# Patient Record
Sex: MALE | Race: WHITE | NOT HISPANIC OR LATINO | Employment: FULL TIME | URBAN - METROPOLITAN AREA
[De-identification: names, ages, dates, MRNs, and addresses within clinical notes are randomized per-mention and may not be internally consistent; named-entity substitution may affect disease eponyms.]

---

## 2017-10-29 ENCOUNTER — OFFICE VISIT (OUTPATIENT)
Dept: URGENT CARE | Facility: CLINIC | Age: 62
End: 2017-10-29
Payer: COMMERCIAL

## 2017-10-29 PROCEDURE — G0382 LEV 3 HOSP TYPE B ED VISIT: HCPCS

## 2018-01-18 NOTE — PROGRESS NOTES
Assessment    1  Left lower quadrant pain (789 04) (R10 32)   2  Viral illness (079 99) (B34 9)    Discussion/Summary  Discussion Summary:   Patient to proceed to [facility] or [address] ED for further evaluation of abdominal pain  Chief Complaint    1  Sore Throat    History of Present Illness  HPI: 63yo M p/w LLQ abdominal pain, nasal congestion, sore throat, and dry cough x 1 day  Pt denies hx of abdominal pain and denies diverticulosis  Pt states abdominal pain is sharp and 9/10 in intensity  Pain is made worse with movement  Pt denies fever, chills, weakness, n/v/d  Review of Systems  Focused-Male:   Constitutional: no fever or chills, feels well, no tiredness, no recent weight loss or weight gain  ENT: sore throat and nasal discharge, but no earache, no nosebleeds, no hearing loss and no hoarseness  Cardiovascular: no complaints of slow or fast heart rate, no chest pain, no palpitations, no leg claudication or lower extremity edema  Respiratory: cough, but no shortness of breath, no orthopnea, no wheezing, no shortness of breath during exertion and no PND  Gastrointestinal: abdominal pain, but no nausea, no vomiting, no constipation, no diarrhea and no blood in stools  Genitourinary: no complaints of dysuria or incontinence, no hesitancy, no nocturia, no genital lesion, no inadequacy of penile erection  Musculoskeletal: no complaints of arthralgia, no myalgia, no joint swelling or stiffness, no limb pain or swelling  Integumentary: no complaints of skin rash or lesion, no itching or dry skin, no skin wounds  Neurological: no complaints of headache, no confusion, no numbness or tingling, no dizziness or fainting  ROS Reviewed:   ROS reviewed  Past Medical History    1  History of gastroesophageal reflux (GERD) (V12 79) (Z87 19)   2  History of hypertension (V12 59) (Z86 79)  Active Problems And Past Medical History Reviewed:    The active problems and past medical history were reviewed and updated today  Family History  Family History Reviewed: The family history was reviewed and updated today  Social History    · Nonsmoker (V49 89) (Z78 9)   · Social alcohol use (Z78 9)  Social History Reviewed: The social history was reviewed and is unchanged  Surgical History  Surgical History Reviewed: The surgical history was reviewed and updated today  Current Meds   1  Bisoprolol-Hydrochlorothiazide 10-6 25 MG Oral Tablet; Therapy: (Recorded:29Oct2017) to Recorded   2  Protonix 40 MG Oral Tablet Delayed Release; Therapy: ([id number]) to Recorded  Medication List Reviewed: The medication list was reviewed and updated today  Allergies    1  No Known Drug Allergies    Vitals  Signs   Recorded: 29Oct2017 10:57AM   Temperature: 99 2 F, Tympanic  Heart Rate: 73  Respiration: 16  Blood Pressure: 151 mm Hg  Blood Pressure: 77 mm Hg  Height: 5 ft 7 in  Weight: 200 lb   BMI Calculated: 31 32 kg/m2  BSA Calculated: 2 02 m2  O2 Saturation: 97  Pain Scale: 5    Physical Exam    Constitutional   General appearance: No acute distress, well appearing and well nourished  Eyes   Conjunctiva and lids: No swelling, erythema, or discharge  Pupils and irises: Equal, round and reactive to light  Ears, Nose, Mouth, and Throat   External inspection of ears and nose: Normal     Otoscopic examination: Tympanic membrance translucent with normal light reflex  Canals patent without erythema  Nasal mucosa, septum, and turbinates: Abnormal   normal nasal septum, no intranasal masses or polyps and normal nasal turbinates  There was clear rhinorrhea from both nares  The bilateral nasal mucosa was edematous  Oropharynx: Abnormal   clear post nasal drip  Pulmonary   Respiratory effort: Abnormal   Respiratory rate: normal  Assessment of respiratory effort revealed normal rhythm and effort  Respiratory Findings: dry cough  Auscultation of lungs: Clear to auscultation    no rales or crackles were heard bilaterally  no rhonchi  no friction rub  no wheezing  no diminished breath sounds  Cardiovascular   Palpation of heart: Normal PMI, no thrills  Auscultation of heart: Normal rate and rhythm, normal S1 and S2, without murmurs  Examination of extremities for edema and/or varicosities: Normal     Abdomen   Abdomen: Abnormal   The abdomen was rounded  Bowel sounds were normal  There was moderate tenderness in the left lower quadrant  no masses palpated  The abdomen was normal to percussion  no CVA tenderness   Liver and spleen: No hepatomegaly or splenomegaly  Lymphatic   Palpation of lymph nodes in neck: Abnormal   bilateral anterior cervical node enlargement, but no posterior cervical node enlargement  Skin   Skin and subcutaneous tissue: Normal without rashes or lesions      Psychiatric   Orientation to person, place and time: Normal     Mood and affect: Normal        Signatures   Electronically signed by : ANASTASIIA Trujillo; Oct 29 2017 11:09AM EST                       (Author)    Electronically signed by : WAQAS Schroeder ; Oct 30 2017  8:54AM EST                       (Co-author)

## 2018-05-20 ENCOUNTER — OFFICE VISIT (OUTPATIENT)
Dept: URGENT CARE | Facility: CLINIC | Age: 63
End: 2018-05-20
Payer: COMMERCIAL

## 2018-05-20 VITALS
RESPIRATION RATE: 20 BRPM | BODY MASS INDEX: 32.18 KG/M2 | DIASTOLIC BLOOD PRESSURE: 70 MMHG | OXYGEN SATURATION: 100 % | HEIGHT: 67 IN | WEIGHT: 205 LBS | TEMPERATURE: 101 F | SYSTOLIC BLOOD PRESSURE: 140 MMHG

## 2018-05-20 DIAGNOSIS — J06.9 UPPER RESPIRATORY TRACT INFECTION, UNSPECIFIED TYPE: Primary | ICD-10-CM

## 2018-05-20 PROCEDURE — G0382 LEV 3 HOSP TYPE B ED VISIT: HCPCS | Performed by: EMERGENCY MEDICINE

## 2018-05-20 RX ORDER — METHYLPREDNISOLONE 4 MG/1
TABLET ORAL
Qty: 21 TABLET | Refills: 0 | Status: SHIPPED | OUTPATIENT
Start: 2018-05-20

## 2018-05-20 RX ORDER — IBUPROFEN 200 MG
400 TABLET ORAL EVERY 6 HOURS PRN
COMMUNITY

## 2018-05-20 RX ORDER — LISINOPRIL 20 MG/1
20 TABLET ORAL DAILY
COMMUNITY

## 2018-05-20 NOTE — PROGRESS NOTES
[facility]        NAME: Akash Mack  is a 61 y o  male  : 1955    MRN: [id number]  DATE: May 20, 2018  TIME: 3:55 PM    Assessment and Plan   Upper respiratory tract infection, unspecified type [J06 9]  1  Upper respiratory tract infection, unspecified type  Methylprednisolone 4 MG TBPK         Patient Instructions     Patient Instructions   You have been diagnosed with a Viral Upper Respiratory infection and your symptoms should resolve over the next 7 to 10 days with the treatments recommended today  If they do not, it is possible that you have developed a bacterial infection and you should return  Take an expectorant - guaifenesin should be the only ingredient - during the day, and the cough suppressant recommended at night only  Take Zinc 12 5 to 15 mg every 2 - 3 hrs while awake for the next few days  You may take Cold Saúl (13 3 mg of Zinc) or split a 25 mg Zinc tablet or lozenge in two or a 50 mg into four to get the proper dose  The total daily dose of Zinc should exceed 75 mg per day  Hold any NSAIDs like Ibuprofen (Advil), Naprosyn (Aleve), etc while on steroids like Medrol or Prednisone    Upper Respiratory Infection, Ambulatory Care   GENERAL INFORMATION:   An upper respiratory infection  is also called a common cold  It can affect your nose, throat, ears, and sinuses  Common symptoms include the following:   · Runny or stuffy nose    · Sneezing and coughing    · Sore throat or hoarseness    · Red, watery, and sore eyes    · Tiredness or restlessness    · Chills and fever    · Headache, body aches, or sore muscles  Seek immediate care for the following symptoms:   · Headaches or a stiff neck    · Bright lights hurt your eyes    · Chest pain or trouble breathing  Treatment for an upper respiratory infection  may include any of the following:  · Decongestants  help decrease nasal congestion and improve your breathing   Do not use decongestant sprays for more than a few days     · Cough suppressants  help decrease coughing  Ask your healthcare provider which type of cough medicine is best for you  Some cough medicines need a doctor's order  · NSAIDs  help decrease swelling and pain or fever  This medicine is available with or without a doctor's order  NSAIDs can cause stomach bleeding or kidney problems in certain people  If you take blood thinner medicine, always ask your healthcare provider if NSAIDs are safe for you  Always read the medicine label and follow directions  Care for an upper respiratory infection:   · Rest  until your fever is gone or you feel better  · Drink liquids as directed to prevent dehydration  You may need to drink 8 to 10 cups of liquid each day  Good liquids to drink include water, ginger ale, tea, or fruit juices  · Gargle  with warm salt water to help your sore throat feel better  Mix ¼ teaspoon salt with 1 cup warm water  You may also suck on hard candy or throat lozenges  · Saline nasal drops  help loosen your nasal congestion  They can be bought without a doctor's order  · Take a warm bath or shower  to help decrease body aches and help you breathe easier  · Use a cool-mist humidifier  to increase air moisture and make it easier for you to breathe  Prevent the spread of germs:   · Avoid others for the first 2 to 3 days of your cold  Germs are easily spread during this time  · Do not share food, drinks,  towels, or personal items with others  · Wash your hands often  Use soap and water  Wash your hands after you use the bathroom, change a child's diapers, or sneeze  Wash your hands before you prepare or eat food  Cover your mouth and nose with a tissue when you sneeze or cough  Follow up with your healthcare provider as directed:  Write down your questions so you remember to ask them during your visits  CARE AGREEMENT:   You have the right to help plan your care   Learn about your health condition and how it may be treated  Discuss treatment options with your caregivers to decide what care you want to receive  You always have the right to refuse treatment  The above information is an  only  It is not intended as medical advice for individual conditions or treatments  Talk to your doctor, nurse or pharmacist before following any medical regimen to see if it is safe and effective for you  © 2014 [address] is for End User's use only and may not be sold, redistributed or otherwise used for commercial purposes  All illustrations and images included in CareNotes® are the copyrighted property of [employer]  or [employer]  Follow up with PCP in 3-5 days  Proceed to  ER if symptoms worsen  Chief Complaint     Chief Complaint   Patient presents with    Cough     fever post nasal drip started last night         History of Present Illness       He complains of ongestion, cough and fever for past day  Review of Systems   Review of Systems   HENT: Positive for congestion, rhinorrhea and sinus pressure  Negative for ear discharge, ear pain and hearing loss  Respiratory: Positive for cough  Negative for shortness of breath and wheezing  Gastrointestinal: Negative for diarrhea and vomiting  Musculoskeletal: Negative for neck stiffness  Skin: Negative for pallor  Neurological: Negative for headaches           Current Medications       Current Outpatient Prescriptions:     ibuprofen (MOTRIN) 200 mg tablet, Take 400 mg by mouth every 6 (six) hours as needed for mild pain, Disp: , Rfl:     lisinopril (ZESTRIL) 20 mg tablet, Take 20 mg by mouth daily, Disp: , Rfl:     Methylprednisolone 4 MG TBPK, Use as directed on package, Disp: 21 tablet, Rfl: 0    Current Allergies     Allergies as of 05/20/2018 - Reviewed 05/20/2018   Allergen Reaction Noted    Sulfa antibiotics Rash 05/20/2018            The following portions of the patient's history were reviewed and updated as appropriate: allergies, current medications, past family history, past medical history, past social history, past surgical history and problem list      Past Medical History:   Diagnosis Date    Hypertension        History reviewed  No pertinent surgical history  No family history on file  Medications have been verified  Objective   /70 (BP Location: Left arm, Patient Position: Sitting, Cuff Size: Standard)   Temp (!) 101 °F (38 3 °C) (Tympanic)   Resp 20   Ht 5' 7" (1 702 m)   Wt 93 kg (205 lb)   SpO2 100%   BMI 32 11 kg/m²        Physical Exam     Physical Exam   Constitutional: He is oriented to person, place, and time  He appears well-developed and well-nourished  No distress  HENT:   Head: Normocephalic and atraumatic  Right Ear: Tympanic membrane, external ear and ear canal normal    Left Ear: Tympanic membrane, external ear and ear canal normal    Nose: Mucosal edema and rhinorrhea present  Mouth/Throat: Posterior oropharyngeal erythema present  Eyes: Conjunctivae are normal  Pupils are equal, round, and reactive to light  Neck: Neck supple  Cardiovascular: Normal rate, regular rhythm and normal heart sounds  Pulmonary/Chest: Effort normal and breath sounds normal    Abdominal: Soft  Bowel sounds are normal    Musculoskeletal: Normal range of motion  Neurological: He is alert and oriented to person, place, and time  Skin: Skin is warm and dry  He is not diaphoretic  No pallor  Psychiatric: He has a normal mood and affect  Nursing note and vitals reviewed

## 2018-05-20 NOTE — PATIENT INSTRUCTIONS
You have been diagnosed with a Viral Upper Respiratory infection and your symptoms should resolve over the next 7 to 10 days with the treatments recommended today  If they do not, it is possible that you have developed a bacterial infection and you should return  Take an expectorant - guaifenesin should be the only ingredient - during the day, and the cough suppressant recommended at night only  Take Zinc 12 5 to 15 mg every 2 - 3 hrs while awake for the next few days  You may take Cold Saúl (13 3 mg of Zinc) or split a 25 mg Zinc tablet or lozenge in two or a 50 mg into four to get the proper dose  The total daily dose of Zinc should exceed 75 mg per day  Hold any NSAIDs like Ibuprofen (Advil), Naprosyn (Aleve), etc while on steroids like Medrol or Prednisone    Upper Respiratory Infection, Ambulatory Care   GENERAL INFORMATION:   An upper respiratory infection  is also called a common cold  It can affect your nose, throat, ears, and sinuses  Common symptoms include the following:   · Runny or stuffy nose    · Sneezing and coughing    · Sore throat or hoarseness    · Red, watery, and sore eyes    · Tiredness or restlessness    · Chills and fever    · Headache, body aches, or sore muscles  Seek immediate care for the following symptoms:   · Headaches or a stiff neck    · Bright lights hurt your eyes    · Chest pain or trouble breathing  Treatment for an upper respiratory infection  may include any of the following:  · Decongestants  help decrease nasal congestion and improve your breathing  Do not use decongestant sprays for more than a few days  · Cough suppressants  help decrease coughing  Ask your healthcare provider which type of cough medicine is best for you  Some cough medicines need a doctor's order  · NSAIDs  help decrease swelling and pain or fever  This medicine is available with or without a doctor's order  NSAIDs can cause stomach bleeding or kidney problems in certain people   If you take blood thinner medicine, always ask your healthcare provider if NSAIDs are safe for you  Always read the medicine label and follow directions  Care for an upper respiratory infection:   · Rest  until your fever is gone or you feel better  · Drink liquids as directed to prevent dehydration  You may need to drink 8 to 10 cups of liquid each day  Good liquids to drink include water, ginger ale, tea, or fruit juices  · Gargle  with warm salt water to help your sore throat feel better  Mix ¼ teaspoon salt with 1 cup warm water  You may also suck on hard candy or throat lozenges  · Saline nasal drops  help loosen your nasal congestion  They can be bought without a doctor's order  · Take a warm bath or shower  to help decrease body aches and help you breathe easier  · Use a cool-mist humidifier  to increase air moisture and make it easier for you to breathe  Prevent the spread of germs:   · Avoid others for the first 2 to 3 days of your cold  Germs are easily spread during this time  · Do not share food, drinks,  towels, or personal items with others  · Wash your hands often  Use soap and water  Wash your hands after you use the bathroom, change a child's diapers, or sneeze  Wash your hands before you prepare or eat food  Cover your mouth and nose with a tissue when you sneeze or cough  Follow up with your healthcare provider as directed:  Write down your questions so you remember to ask them during your visits  CARE AGREEMENT:   You have the right to help plan your care  Learn about your health condition and how it may be treated  Discuss treatment options with your caregivers to decide what care you want to receive  You always have the right to refuse treatment  The above information is an  only  It is not intended as medical advice for individual conditions or treatments   Talk to your doctor, nurse or pharmacist before following any medical regimen to see if it is safe and effective for you  © 2014 [address] is for End User's use only and may not be sold, redistributed or otherwise used for commercial purposes  All illustrations and images included in CareNotes® are the copyrighted property of A D [employer] M [employer]  or [employer]era Brasher

## 2019-03-29 ENCOUNTER — TRANSCRIBE ORDERS (OUTPATIENT)
Dept: URGENT CARE | Facility: CLINIC | Age: 64
End: 2019-03-29

## 2019-03-29 ENCOUNTER — APPOINTMENT (OUTPATIENT)
Dept: RADIOLOGY | Facility: CLINIC | Age: 64
End: 2019-03-29
Payer: COMMERCIAL

## 2019-03-29 DIAGNOSIS — M47.817 SPONDYLOSIS WITHOUT MYELOPATHY OR RADICULOPATHY, LUMBOSACRAL REGION: Primary | ICD-10-CM

## 2019-03-29 DIAGNOSIS — M47.817 SPONDYLOSIS WITHOUT MYELOPATHY OR RADICULOPATHY, LUMBOSACRAL REGION: ICD-10-CM

## 2019-03-29 DIAGNOSIS — M46.1 SACROILIITIS, NOT ELSEWHERE CLASSIFIED (HCC): ICD-10-CM

## 2019-03-29 PROCEDURE — 73522 X-RAY EXAM HIPS BI 3-4 VIEWS: CPT

## 2019-03-29 PROCEDURE — 72110 X-RAY EXAM L-2 SPINE 4/>VWS: CPT

## 2019-11-02 ENCOUNTER — LAB REQUISITION (OUTPATIENT)
Dept: LAB | Facility: HOSPITAL | Age: 64
End: 2019-11-02
Payer: COMMERCIAL

## 2019-11-02 DIAGNOSIS — Z13.220 ENCOUNTER FOR SCREENING FOR LIPOID DISORDERS: ICD-10-CM

## 2019-11-02 LAB — CHOLEST SERPL-MCNC: 169 MG/DL (ref 50–200)

## 2020-06-16 ENCOUNTER — DOCTOR'S OFFICE (OUTPATIENT)
Dept: URBAN - NONMETROPOLITAN AREA CLINIC 1 | Facility: CLINIC | Age: 65
Setting detail: OPHTHALMOLOGY
End: 2020-06-16
Payer: COMMERCIAL

## 2020-06-16 DIAGNOSIS — H52.4: ICD-10-CM

## 2020-06-16 PROCEDURE — 92014 COMPRE OPH EXAM EST PT 1/>: CPT | Performed by: OPTOMETRIST

## 2020-06-16 ASSESSMENT — REFRACTION_CURRENTRX
OS_SPHERE: +0.75
OS_VPRISM_DIRECTION: BF
OS_ADD: +2.25
OD_AXIS: 106
OD_SPHERE: +1.00
OD_ADD: +2.25
OD_OVR_VA: 20/
OS_OVR_VA: 20/
OS_AXIS: 76
OD_CYLINDER: -0.50
OS_CYLINDER: -0.50
OD_VPRISM_DIRECTION: BF

## 2020-06-16 ASSESSMENT — SPHEQUIV_DERIVED
OS_SPHEQUIV: 0.5
OD_SPHEQUIV: 0.75
OD_SPHEQUIV: 0.5
OS_SPHEQUIV: 0.25

## 2020-06-16 ASSESSMENT — REFRACTION_MANIFEST
OS_ADD: +2.50
OD_AXIS: 105
OS_VA1: 20/25-2
OD_VA2: 20/25-2
OS_AXIS: 070
OS_CYLINDER: -0.50
OS_SPHERE: +0.75
OD_ADD: +2.50
OS_VA2: 20/25-2
OD_SPHERE: +0.75
OD_VA1: 20/25-2
OD_CYLINDER: -0.50

## 2020-06-16 ASSESSMENT — REFRACTION_AUTOREFRACTION
OD_CYLINDER: -1.00
OS_SPHERE: +0.75
OD_SPHERE: +1.25
OS_CYLINDER: -1.00
OS_AXIS: 060
OD_AXIS: 088

## 2020-06-16 ASSESSMENT — LID POSITION - PTOSIS
OD_PTOSIS: RUL +2
OS_PTOSIS: LUL +1

## 2020-06-16 ASSESSMENT — VISUAL ACUITY
OD_BCVA: 20/30+2
OS_BCVA: 20/25-2

## 2020-06-16 ASSESSMENT — CONFRONTATIONAL VISUAL FIELD TEST (CVF)
OS_FINDINGS: FULL
OD_FINDINGS: FULL

## 2021-03-10 ENCOUNTER — DOCTOR'S OFFICE (OUTPATIENT)
Dept: URBAN - NONMETROPOLITAN AREA CLINIC 1 | Facility: CLINIC | Age: 66
Setting detail: OPHTHALMOLOGY
End: 2021-03-10
Payer: COMMERCIAL

## 2021-03-10 ENCOUNTER — OPTICAL OFFICE (OUTPATIENT)
Dept: URBAN - NONMETROPOLITAN AREA CLINIC 4 | Facility: CLINIC | Age: 66
Setting detail: OPHTHALMOLOGY
End: 2021-03-10
Payer: COMMERCIAL

## 2021-03-10 DIAGNOSIS — H52.223: ICD-10-CM

## 2021-03-10 DIAGNOSIS — H52.4: ICD-10-CM

## 2021-03-10 DIAGNOSIS — H35.3131: ICD-10-CM

## 2021-03-10 PROBLEM — H02.403: Status: ACTIVE | Noted: 2020-06-16

## 2021-03-10 PROCEDURE — 92015 DETERMINE REFRACTIVE STATE: CPT | Performed by: OPTOMETRIST

## 2021-03-10 PROCEDURE — 92134 CPTRZ OPH DX IMG PST SGM RTA: CPT | Performed by: OPTOMETRIST

## 2021-03-10 PROCEDURE — V9999 DISPENSING FEE: HCPCS | Performed by: OPTOMETRIST

## 2021-03-10 PROCEDURE — 92014 COMPRE OPH EXAM EST PT 1/>: CPT | Performed by: OPTOMETRIST

## 2021-03-10 PROCEDURE — V2799 MISC VISION ITEM OR SERVICE: HCPCS | Performed by: OPTOMETRIST

## 2021-03-10 PROCEDURE — V2020 VISION SVCS FRAMES PURCHASES: HCPCS | Performed by: OPTOMETRIST

## 2021-03-10 PROCEDURE — V2025 EYEGLASSES DELUX FRAMES: HCPCS | Performed by: OPTOMETRIST

## 2021-03-10 ASSESSMENT — REFRACTION_CURRENTRX
OS_CYLINDER: -0.50
OS_AXIS: 76
OD_AXIS: 106
OD_SPHERE: +1.00
OS_OVR_VA: 20/
OD_ADD: +2.25
OD_OVR_VA: 20/
OD_CYLINDER: -0.50
OS_SPHERE: +0.75
OS_VPRISM_DIRECTION: BF
OD_VPRISM_DIRECTION: BF
OS_ADD: +2.25

## 2021-03-10 ASSESSMENT — REFRACTION_MANIFEST
OD_VA1: 20/25-2
OS_ADD: +2.50
OD_VA2: 20/25-2
OS_VA2: 20/25-2
OD_AXIS: 105
OS_SPHERE: +0.75
OD_ADD: +2.50
OS_CYLINDER: -0.50
OS_AXIS: 070
OS_VA1: 20/30-2
OD_CYLINDER: -0.50
OD_SPHERE: +1.00

## 2021-03-10 ASSESSMENT — REFRACTION_AUTOREFRACTION
OD_CYLINDER: -0.75
OS_AXIS: 072
OD_AXIS: 160
OS_SPHERE: +2.75
OD_SPHERE: +1.00
OS_CYLINDER: -2.50

## 2021-03-10 ASSESSMENT — TONOMETRY
OS_IOP_MMHG: 15
OD_IOP_MMHG: 15

## 2021-03-10 ASSESSMENT — SPHEQUIV_DERIVED
OD_SPHEQUIV: 0.625
OD_SPHEQUIV: 0.75
OS_SPHEQUIV: 1.5
OS_SPHEQUIV: 0.5

## 2021-03-10 ASSESSMENT — LID POSITION - PTOSIS
OD_PTOSIS: RUL +2
OS_PTOSIS: LUL +1

## 2021-03-10 ASSESSMENT — CONFRONTATIONAL VISUAL FIELD TEST (CVF)
OS_FINDINGS: FULL
OD_FINDINGS: FULL

## 2021-03-10 ASSESSMENT — VISUAL ACUITY
OS_BCVA: 20/30-1
OD_BCVA: 20/30-2

## 2024-05-11 ENCOUNTER — HOSPITAL ENCOUNTER (EMERGENCY)
Facility: HOSPITAL | Age: 69
Discharge: HOME | End: 2024-05-11
Attending: EMERGENCY MEDICINE
Payer: COMMERCIAL

## 2024-05-11 ENCOUNTER — APPOINTMENT (EMERGENCY)
Dept: RADIOLOGY | Facility: HOSPITAL | Age: 69
End: 2024-05-11
Payer: COMMERCIAL

## 2024-05-11 VITALS
TEMPERATURE: 97.3 F | HEIGHT: 67 IN | HEART RATE: 70 BPM | BODY MASS INDEX: 29.58 KG/M2 | OXYGEN SATURATION: 97 % | SYSTOLIC BLOOD PRESSURE: 150 MMHG | DIASTOLIC BLOOD PRESSURE: 82 MMHG | WEIGHT: 188.49 LBS | RESPIRATION RATE: 16 BRPM

## 2024-05-11 DIAGNOSIS — S61.220A LACERATION OF RIGHT INDEX FINGER WITH FOREIGN BODY WITHOUT DAMAGE TO NAIL, INITIAL ENCOUNTER: Primary | ICD-10-CM

## 2024-05-11 PROCEDURE — 99284 EMERGENCY DEPT VISIT MOD MDM: CPT | Performed by: PHYSICIAN ASSISTANT

## 2024-05-11 PROCEDURE — 90471 IMMUNIZATION ADMIN: CPT

## 2024-05-11 PROCEDURE — 12002 RPR S/N/AX/GEN/TRNK2.6-7.5CM: CPT | Performed by: PHYSICIAN ASSISTANT

## 2024-05-11 PROCEDURE — 90715 TDAP VACCINE 7 YRS/> IM: CPT | Performed by: PHYSICIAN ASSISTANT

## 2024-05-11 PROCEDURE — 73140 X-RAY EXAM OF FINGER(S): CPT

## 2024-05-11 PROCEDURE — 99283 EMERGENCY DEPT VISIT LOW MDM: CPT

## 2024-05-11 RX ORDER — ASPIRIN 81 MG/1
81 TABLET, CHEWABLE ORAL DAILY
COMMUNITY

## 2024-05-11 RX ORDER — CEPHALEXIN 500 MG/1
500 CAPSULE ORAL EVERY 6 HOURS SCHEDULED
Qty: 28 CAPSULE | Refills: 0 | Status: SHIPPED | OUTPATIENT
Start: 2024-05-11 | End: 2024-05-18

## 2024-05-11 RX ORDER — CEPHALEXIN 250 MG/1
500 CAPSULE ORAL ONCE
Status: COMPLETED | OUTPATIENT
Start: 2024-05-11 | End: 2024-05-11

## 2024-05-11 RX ORDER — ROSUVASTATIN CALCIUM 20 MG/1
20 TABLET, COATED ORAL DAILY
COMMUNITY
Start: 2024-01-16

## 2024-05-11 RX ORDER — GINSENG 100 MG
1 CAPSULE ORAL ONCE
Status: COMPLETED | OUTPATIENT
Start: 2024-05-11 | End: 2024-05-11

## 2024-05-11 RX ORDER — BISOPROLOL FUMARATE 5 MG/1
5 TABLET, FILM COATED ORAL DAILY
COMMUNITY
Start: 2024-01-16

## 2024-05-11 RX ORDER — LIDOCAINE HYDROCHLORIDE 10 MG/ML
10 INJECTION, SOLUTION EPIDURAL; INFILTRATION; INTRACAUDAL; PERINEURAL ONCE
Status: COMPLETED | OUTPATIENT
Start: 2024-05-11 | End: 2024-05-11

## 2024-05-11 RX ORDER — ONDANSETRON 4 MG/1
4 TABLET, ORALLY DISINTEGRATING ORAL ONCE
Status: COMPLETED | OUTPATIENT
Start: 2024-05-11 | End: 2024-05-11

## 2024-05-11 RX ORDER — PANTOPRAZOLE SODIUM 40 MG
40 TABLET, DELAYED RELEASE (ENTERIC COATED) ORAL DAILY
COMMUNITY

## 2024-05-11 RX ADMIN — TETANUS TOXOID, REDUCED DIPHTHERIA TOXOID AND ACELLULAR PERTUSSIS VACCINE, ADSORBED 0.5 ML: 5; 2.5; 8; 8; 2.5 SUSPENSION INTRAMUSCULAR at 11:29

## 2024-05-11 RX ADMIN — LIDOCAINE HYDROCHLORIDE 10 ML: 10 INJECTION, SOLUTION EPIDURAL; INFILTRATION; INTRACAUDAL; PERINEURAL at 11:29

## 2024-05-11 RX ADMIN — BACITRACIN 1 LARGE APPLICATION: 500 OINTMENT TOPICAL at 12:32

## 2024-05-11 RX ADMIN — CEPHALEXIN 500 MG: 250 CAPSULE ORAL at 12:32

## 2024-05-11 RX ADMIN — ONDANSETRON 4 MG: 4 TABLET, ORALLY DISINTEGRATING ORAL at 11:39

## 2024-05-11 NOTE — DISCHARGE INSTRUCTIONS
Clean and dry for the first 48 hours, then can change dressing.  Please have the sutures removed in 12 days.  This was a complex laceration repair.  Please wear splint to keep the sutures intact and safe.  Due to the active grinding the metal most of the fragments were removed but there are probably some small metal fragments intact.  Please continue antibiotics.

## 2024-05-11 NOTE — ED PROVIDER NOTES
History  Chief Complaint   Patient presents with    Finger Laceration     Right pointer finger laceration while grinding. States tetanus is not UTD.      Patient is a 69-year-old male presents emerged from today with a chief complaint of a finger laceration.  Patient states that he was grinding prior to arrival and accidentally caused a laceration to his right index finger.  Last tetanus was in 2012.  Patient is right-handed.  Patient is on 81 mg aspirin daily.  Came directly to the ER.      Finger Laceration  Location:  Finger  Length:  4 cm  Quality: jagged    Bleeding: controlled with pressure    Laceration mechanism:  Metal edge  Pain details:     Severity:  Moderate  Foreign body present:  Metal  Tetanus status:  Unknown  Associated symptoms: no fever and no numbness        Prior to Admission Medications   Prescriptions Last Dose Informant Patient Reported? Taking?   Methylprednisolone 4 MG TBPK Not Taking  No No   Sig: Use as directed on package   Patient not taking: Reported on 5/11/2024   Protonix 40 MG tablet   Yes Yes   Sig: Take 40 mg by mouth daily   aspirin 81 mg chewable tablet   Yes Yes   Sig: Chew 81 mg daily   bisoprolol (ZEBETA) 5 mg tablet   Yes Yes   Sig: Take 5 mg by mouth daily   ibuprofen (MOTRIN) 200 mg tablet  Self Yes No   Sig: Take 400 mg by mouth every 6 (six) hours as needed for mild pain   lisinopril (ZESTRIL) 20 mg tablet Not Taking Self Yes No   Sig: Take 20 mg by mouth daily   Patient not taking: Reported on 5/11/2024   rosuvastatin (CRESTOR) 20 MG tablet   Yes Yes   Sig: Take 20 mg by mouth daily      Facility-Administered Medications: None       Past Medical History:   Diagnosis Date    Atherosclerosis of coronary artery     GERD (gastroesophageal reflux disease)     Hypertension     IBS (irritable bowel syndrome)        No past surgical history on file.    No family history on file.  I have reviewed and agree with the history as documented.    E-Cigarette/Vaping      E-Cigarette/Vaping Substances     Social History     Tobacco Use    Smoking status: Never    Smokeless tobacco: Never   Substance Use Topics    Alcohol use: No    Drug use: No       Review of Systems   Constitutional:  Negative for fever.       Physical Exam  Physical Exam  Vitals and nursing note reviewed.   Constitutional:       General: He is in acute distress.      Appearance: He is well-developed.   HENT:      Head: Normocephalic and atraumatic.   Eyes:      Extraocular Movements: Extraocular movements intact.   Cardiovascular:      Rate and Rhythm: Normal rate.   Pulmonary:      Effort: Pulmonary effort is normal.   Musculoskeletal:      Right hand: Laceration present.        Hands:       Cervical back: Normal range of motion.   Skin:     General: Skin is warm and dry.      Capillary Refill: Capillary refill takes less than 2 seconds.   Neurological:      Mental Status: He is alert and oriented to person, place, and time.   Psychiatric:         Behavior: Behavior normal.         Vital Signs  ED Triage Vitals [05/11/24 1123]   Temperature Pulse Respirations Blood Pressure SpO2   (!) 97.3 °F (36.3 °C) 70 16 150/82 97 %      Temp Source Heart Rate Source Patient Position - Orthostatic VS BP Location FiO2 (%)   Temporal Monitor Lying Left arm --      Pain Score       --           Vitals:    05/11/24 1123   BP: 150/82   Pulse: 70   Patient Position - Orthostatic VS: Lying         Visual Acuity      ED Medications  Medications   lidocaine (PF) (XYLOCAINE-MPF) 1 % injection 10 mL (10 mL Infiltration Given 5/11/24 1129)   tetanus-diphtheria-acellular pertussis (BOOSTRIX) IM injection 0.5 mL (0.5 mL Intramuscular Given 5/11/24 1129)   ondansetron (ZOFRAN-ODT) dispersible tablet 4 mg (4 mg Oral Given 5/11/24 1139)   bacitracin topical ointment 1 large application (1 large application Topical Given 5/11/24 1232)   cephalexin (KEFLEX) capsule 500 mg (500 mg Oral Given 5/11/24 1232)       Diagnostic Studies  Results  Reviewed       None                   XR finger second digit-index RIGHT    (Results Pending)   XR finger second digit-index RIGHT    (Results Pending)              Procedures  Universal Protocol:  Consent: Verbal consent obtained.  Risks and benefits: risks, benefits and alternatives were discussed  Consent given by: patient  Laceration repair    Date/Time: 5/11/2024 11:34 AM    Performed by: Bryson Redmond PA-C  Authorized by: Bryson Redmond PA-C  Anesthesia: digital block    Anesthesia:  Local Anesthetic: lidocaine 1% without epinephrine      Procedure Details:  Preparation: Patient was prepped and draped in the usual sterile fashion.  Amount of cleaning: extensive  Debridement: extensive  Skin closure: Ethilon  Number of sutures: 4  Technique: simple  Approximation: close  Approximation difficulty: complex  Dressing: antibiotic ointment, 4x4 sterile gauze and splint  Patient tolerance: patient tolerated the procedure well with no immediate complications  Cleaning details: metal             ED Course                                             Medical Decision Making  Patient is a 69-year-old male presents emerged from today with a chief complaint of a finger laceration.  Patient states that he was grinding prior to arrival and accidentally caused a laceration to his right index finger.  Last tetanus was in 2012.  Patient is right-handed.  Patient is on 81 mg aspirin daily.  Came directly to the ER.    Patient noted on initial x-ray to have foreign bodies/metallic intact, did have extensive cleaning, laceration repaired, patient given wound care updated tetanus and placed on Keflex.  The patient's repeat x-ray noted to have improvement of retained foreign bodies did have possible 1 very puncture wound radiopaque area.  Patient educated on this finding in agreement treatment plan to discharge.    Amount and/or Complexity of Data Reviewed  Radiology: ordered and independent interpretation performed.  Decision-making details documented in ED Course.    Risk  OTC drugs.  Prescription drug management.             Disposition  Final diagnoses:   Laceration of right index finger with foreign body without damage to nail, initial encounter     Time reflects when diagnosis was documented in both MDM as applicable and the Disposition within this note       Time User Action Codes Description Comment    5/11/2024 12:28 PM Bryson Redmond Add [S61.220A] Laceration of right index finger with foreign body without damage to nail, initial encounter           ED Disposition       ED Disposition   Discharge    Condition   Stable    Date/Time   Sat May 11, 2024 12:28 PM    Comment   Castillo Anguiano Jr. discharge to home/self care.                   Follow-up Information    None         Discharge Medication List as of 5/11/2024 12:38 PM        START taking these medications    Details   cephalexin (KEFLEX) 500 mg capsule Take 1 capsule (500 mg total) by mouth every 6 (six) hours for 7 days, Starting Sat 5/11/2024, Until Sat 5/18/2024, Normal           CONTINUE these medications which have NOT CHANGED    Details   aspirin 81 mg chewable tablet Chew 81 mg daily, Historical Med      bisoprolol (ZEBETA) 5 mg tablet Take 5 mg by mouth daily, Starting Tue 1/16/2024, Historical Med      Protonix 40 MG tablet Take 40 mg by mouth daily, Historical Med      rosuvastatin (CRESTOR) 20 MG tablet Take 20 mg by mouth daily, Starting Tue 1/16/2024, Historical Med      ibuprofen (MOTRIN) 200 mg tablet Take 400 mg by mouth every 6 (six) hours as needed for mild pain, Historical Med      lisinopril (ZESTRIL) 20 mg tablet Take 20 mg by mouth daily, Historical Med      Methylprednisolone 4 MG TBPK Use as directed on package, Normal             No discharge procedures on file.    PDMP Review       None            ED Provider  Electronically Signed by             Bryson Redmond PA-C  05/11/24 9520

## 2024-10-11 ENCOUNTER — DOCTOR'S OFFICE (OUTPATIENT)
Dept: URBAN - NONMETROPOLITAN AREA CLINIC 1 | Facility: CLINIC | Age: 69
Setting detail: OPHTHALMOLOGY
End: 2024-10-11
Payer: MEDICARE

## 2024-10-11 DIAGNOSIS — H02.403: ICD-10-CM

## 2024-10-11 DIAGNOSIS — H43.393: ICD-10-CM

## 2024-10-11 DIAGNOSIS — H35.3131: ICD-10-CM

## 2024-10-11 DIAGNOSIS — H25.13: ICD-10-CM

## 2024-10-11 PROCEDURE — 92004 COMPRE OPH EXAM NEW PT 1/>: CPT | Performed by: OPTOMETRIST

## 2024-10-11 PROCEDURE — 92134 CPTRZ OPH DX IMG PST SGM RTA: CPT | Performed by: OPTOMETRIST

## 2024-10-11 ASSESSMENT — VISUAL ACUITY
OS_BCVA: 20/40-2
OD_BCVA: 20/40-2

## 2024-10-11 ASSESSMENT — REFRACTION_CURRENTRX
OS_AXIS: 76
OS_CYLINDER: -0.50
OD_ADD: +2.25
OS_VPRISM_DIRECTION: BF
OD_CYLINDER: -0.50
OD_OVR_VA: 20/
OD_SPHERE: +1.00
OD_VPRISM_DIRECTION: BF
OS_SPHERE: +0.75
OS_OVR_VA: 20/
OD_AXIS: 106
OS_ADD: +2.25

## 2024-10-11 ASSESSMENT — REFRACTION_MANIFEST
OS_VA1: 20/40+2
OD_VA2: 20/40+2
OS_SPHERE: +1.50
OD_VA1: 20/40+2
OS_VA2: 20/40+2
OS_ADD: +2.50
OS_CYLINDER: -1.75
OD_ADD: +2.50
OS_AXIS: 070
OD_SPHERE: +1.25
OD_AXIS: 110
OD_CYLINDER: -1.00

## 2024-10-11 ASSESSMENT — LID POSITION - PTOSIS
OD_PTOSIS: RUL +2
OS_PTOSIS: LUL +1

## 2024-10-11 ASSESSMENT — REFRACTION_AUTOREFRACTION
OD_CYLINDER: -1.00
OD_SPHERE: +1.25
OD_AXIS: 128
OS_SPHERE: +1.50
OS_AXIS: 070
OS_CYLINDER: -1.75

## 2024-10-11 ASSESSMENT — TONOMETRY
OD_IOP_MMHG: 16
OS_IOP_MMHG: 16

## 2024-10-11 ASSESSMENT — CONFRONTATIONAL VISUAL FIELD TEST (CVF)
OD_FINDINGS: FULL
OS_FINDINGS: FULL

## 2024-10-18 ENCOUNTER — DOCTOR'S OFFICE (OUTPATIENT)
Dept: URBAN - NONMETROPOLITAN AREA CLINIC 1 | Facility: CLINIC | Age: 69
Setting detail: OPHTHALMOLOGY
End: 2024-10-18
Payer: COMMERCIAL

## 2024-10-18 DIAGNOSIS — H43.393: ICD-10-CM

## 2024-10-18 DIAGNOSIS — H25.13: ICD-10-CM

## 2024-10-18 DIAGNOSIS — H02.403: ICD-10-CM

## 2024-10-18 DIAGNOSIS — H35.3131: ICD-10-CM

## 2024-10-18 PROCEDURE — 99214 OFFICE O/P EST MOD 30 MIN: CPT | Performed by: OPHTHALMOLOGY

## 2024-10-18 PROCEDURE — 92134 CPTRZ OPH DX IMG PST SGM RTA: CPT | Performed by: OPHTHALMOLOGY

## 2024-10-18 ASSESSMENT — KERATOMETRY
OD_K1POWER_DIOPTERS: 45.25
OS_K1POWER_DIOPTERS: 44.75
OD_AXISANGLE_DEGREES: 083
OS_K2POWER_DIOPTERS: 45.75
OD_K2POWER_DIOPTERS: 47.25
OS_AXISANGLE_DEGREES: 162

## 2024-10-18 ASSESSMENT — REFRACTION_CURRENTRX
OS_OVR_VA: 20/
OS_AXIS: 76
OD_SPHERE: +1.00
OD_VPRISM_DIRECTION: BF
OD_ADD: +2.25
OS_ADD: +2.25
OS_SPHERE: +0.75
OD_OVR_VA: 20/
OD_AXIS: 106
OS_VPRISM_DIRECTION: BF
OD_CYLINDER: -0.50
OS_CYLINDER: -0.50

## 2024-10-18 ASSESSMENT — REFRACTION_MANIFEST
OD_CYLINDER: -1.00
OS_CYLINDER: -1.75
OS_ADD: +2.50
OS_VA2: 20/40+2
OD_VA1: 20/40+2
OS_VA1: 20/40+2
OD_VA2: 20/40+2
OD_AXIS: 110
OS_SPHERE: +1.50
OD_SPHERE: +1.25
OS_AXIS: 070
OD_ADD: +2.50

## 2024-10-18 ASSESSMENT — CONFRONTATIONAL VISUAL FIELD TEST (CVF)
OS_FINDINGS: FULL
OD_FINDINGS: FULL

## 2024-10-18 ASSESSMENT — VISUAL ACUITY
OD_BCVA: 20/30+2
OS_BCVA: 20/30-2

## 2024-10-18 ASSESSMENT — REFRACTION_AUTOREFRACTION
OS_AXIS: 071
OD_AXIS: 104
OD_CYLINDER: -0.50
OD_SPHERE: +0.75
OS_SPHERE: +2.00
OS_CYLINDER: -2.25

## 2024-10-18 ASSESSMENT — LID POSITION - PTOSIS
OD_PTOSIS: RUL +2
OS_PTOSIS: LUL +1

## 2024-11-26 ENCOUNTER — DOCTOR'S OFFICE (OUTPATIENT)
Dept: URBAN - NONMETROPOLITAN AREA CLINIC 1 | Facility: CLINIC | Age: 69
Setting detail: OPHTHALMOLOGY
End: 2024-11-26
Payer: COMMERCIAL

## 2024-11-26 DIAGNOSIS — H25.12: ICD-10-CM

## 2024-11-26 PROCEDURE — 92136 OPHTHALMIC BIOMETRY: CPT | Mod: LT | Performed by: OPHTHALMOLOGY

## 2024-12-10 ENCOUNTER — AMBUL SURGICAL CARE (OUTPATIENT)
Dept: URBAN - NONMETROPOLITAN AREA SURGERY 1 | Facility: SURGERY | Age: 69
Setting detail: OPHTHALMOLOGY
End: 2024-12-10
Payer: COMMERCIAL

## 2024-12-10 DIAGNOSIS — H25.12: ICD-10-CM

## 2024-12-10 PROCEDURE — G8918 PT W/O PREOP ORDER IV AB PRO: HCPCS | Mod: LT | Performed by: OPHTHALMOLOGY

## 2024-12-10 PROCEDURE — 66984 XCAPSL CTRC RMVL W/O ECP: CPT | Mod: LT | Performed by: OPHTHALMOLOGY

## 2024-12-10 PROCEDURE — G8907 PT DOC NO EVENTS ON DISCHARG: HCPCS | Mod: LT | Performed by: OPHTHALMOLOGY

## 2024-12-11 ENCOUNTER — RX ONLY (RX ONLY)
Age: 69
End: 2024-12-11

## 2024-12-11 ENCOUNTER — DOCTOR'S OFFICE (OUTPATIENT)
Dept: URBAN - NONMETROPOLITAN AREA CLINIC 1 | Facility: CLINIC | Age: 69
Setting detail: OPHTHALMOLOGY
End: 2024-12-11
Payer: COMMERCIAL

## 2024-12-11 DIAGNOSIS — H25.11: ICD-10-CM

## 2024-12-11 DIAGNOSIS — Z96.1: ICD-10-CM

## 2024-12-11 PROCEDURE — 99024 POSTOP FOLLOW-UP VISIT: CPT | Performed by: OPHTHALMOLOGY

## 2024-12-11 PROCEDURE — 92136 OPHTHALMIC BIOMETRY: CPT | Mod: 26,RT | Performed by: OPHTHALMOLOGY

## 2024-12-11 ASSESSMENT — REFRACTION_CURRENTRX
OD_AXIS: 106
OS_CYLINDER: -0.50
OD_OVR_VA: 20/
OD_VPRISM_DIRECTION: BF
OD_ADD: +2.25
OS_VPRISM_DIRECTION: BF
OS_OVR_VA: 20/
OD_CYLINDER: -0.50
OS_SPHERE: +0.75
OS_ADD: +2.25
OD_SPHERE: +1.00
OS_AXIS: 76

## 2024-12-11 ASSESSMENT — REFRACTION_MANIFEST
OS_ADD: +2.50
OD_VA1: 20/40+2
OD_VA2: 20/40+2
OD_AXIS: 110
OS_SPHERE: +1.50
OD_CYLINDER: -1.00
OS_VA2: 20/40+2
OS_CYLINDER: -1.75
OD_SPHERE: +1.25
OD_ADD: +2.50
OS_VA1: 20/40+2
OS_AXIS: 070

## 2024-12-11 ASSESSMENT — KERATOMETRY
OS_K1POWER_DIOPTERS: 44.50
OD_AXISANGLE_DEGREES: 075
OD_K2POWER_DIOPTERS: 44.50
OS_K2POWER_DIOPTERS: 46.00
OD_K1POWER_DIOPTERS: 44.00
OS_AXISANGLE_DEGREES: 121

## 2024-12-11 ASSESSMENT — VISUAL ACUITY
OD_BCVA: 20/50+2
OS_BCVA: 20/30-2

## 2024-12-11 ASSESSMENT — REFRACTION_AUTOREFRACTION
OS_CYLINDER: -4.25
OS_AXIS: 076
OD_SPHERE: +1.00
OD_CYLINDER: -0.75
OS_SPHERE: +3.25
OD_AXIS: 012

## 2024-12-11 ASSESSMENT — LID POSITION - PTOSIS
OS_PTOSIS: LUL +1
OD_PTOSIS: RUL +2

## 2024-12-11 ASSESSMENT — TONOMETRY: OD_IOP_MMHG: 18

## 2024-12-23 ENCOUNTER — AMBUL SURGICAL CARE (OUTPATIENT)
Dept: URBAN - NONMETROPOLITAN AREA SURGERY 1 | Facility: SURGERY | Age: 69
Setting detail: OPHTHALMOLOGY
End: 2024-12-23
Payer: COMMERCIAL

## 2024-12-23 DIAGNOSIS — H25.11: ICD-10-CM

## 2024-12-23 PROCEDURE — 66984 XCAPSL CTRC RMVL W/O ECP: CPT | Mod: 79,RT | Performed by: OPHTHALMOLOGY

## 2024-12-23 PROCEDURE — G8907 PT DOC NO EVENTS ON DISCHARG: HCPCS | Mod: RT | Performed by: OPHTHALMOLOGY

## 2024-12-23 PROCEDURE — 66984 XCAPSL CTRC RMVL W/O ECP: CPT | Mod: RT | Performed by: OPHTHALMOLOGY

## 2024-12-23 PROCEDURE — G8918 PT W/O PREOP ORDER IV AB PRO: HCPCS | Mod: RT | Performed by: OPHTHALMOLOGY

## 2024-12-24 ENCOUNTER — DOCTOR'S OFFICE (OUTPATIENT)
Dept: URBAN - NONMETROPOLITAN AREA CLINIC 1 | Facility: CLINIC | Age: 69
Setting detail: OPHTHALMOLOGY
End: 2024-12-24

## 2024-12-24 DIAGNOSIS — Z96.1: ICD-10-CM

## 2024-12-24 PROCEDURE — 99024 POSTOP FOLLOW-UP VISIT: CPT | Performed by: OPHTHALMOLOGY

## 2024-12-24 ASSESSMENT — REFRACTION_CURRENTRX
OD_VPRISM_DIRECTION: BF
OS_SPHERE: +0.75
OS_OVR_VA: 20/
OS_CYLINDER: -0.50
OD_SPHERE: +1.00
OD_OVR_VA: 20/
OS_AXIS: 76
OD_AXIS: 106
OS_VPRISM_DIRECTION: BF
OD_ADD: +2.25
OD_CYLINDER: -0.50
OS_ADD: +2.25

## 2024-12-24 ASSESSMENT — REFRACTION_MANIFEST
OS_SPHERE: +1.50
OD_SPHERE: +1.25
OS_ADD: +2.50
OS_VA2: 20/40+2
OS_AXIS: 070
OD_VA1: 20/40+2
OS_CYLINDER: -1.75
OS_VA1: 20/40+2
OD_ADD: +2.50
OD_CYLINDER: -1.00
OD_VA2: 20/40+2
OD_AXIS: 110

## 2024-12-24 ASSESSMENT — REFRACTION_AUTOREFRACTION
OS_SPHERE: -0.25
OD_AXIS: 134
OD_SPHERE: +1.00
OS_AXIS: 076
OS_CYLINDER: -1.25
OD_CYLINDER: -1.25

## 2024-12-24 ASSESSMENT — KERATOMETRY
OD_AXISANGLE_DEGREES: 073
OD_K2POWER_DIOPTERS: 51.25
OD_K1POWER_DIOPTERS: 44.25
OS_K2POWER_DIOPTERS: 48.00
OS_AXISANGLE_DEGREES: 103
OS_K1POWER_DIOPTERS: 45.00

## 2024-12-24 ASSESSMENT — LID POSITION - PTOSIS
OS_PTOSIS: LUL +1
OD_PTOSIS: RUL +2

## 2024-12-24 ASSESSMENT — VISUAL ACUITY
OD_BCVA: 20/25-1
OS_BCVA: 20/40

## 2025-07-16 ENCOUNTER — OCCMED (OUTPATIENT)
Dept: URGENT CARE | Facility: CLINIC | Age: 70
End: 2025-07-16

## 2025-07-16 DIAGNOSIS — Z02.89 ENCOUNTER FOR EXAMINATION REQUIRED BY DEPARTMENT OF TRANSPORTATION (DOT): Primary | ICD-10-CM
